# Patient Record
Sex: FEMALE | Race: WHITE | NOT HISPANIC OR LATINO | URBAN - METROPOLITAN AREA
[De-identification: names, ages, dates, MRNs, and addresses within clinical notes are randomized per-mention and may not be internally consistent; named-entity substitution may affect disease eponyms.]

---

## 2023-05-09 ENCOUNTER — EMERGENCY (EMERGENCY)
Facility: HOSPITAL | Age: 38
LOS: 1 days | Discharge: HOME | End: 2023-05-09
Admitting: STUDENT IN AN ORGANIZED HEALTH CARE EDUCATION/TRAINING PROGRAM
Payer: COMMERCIAL

## 2023-05-09 VITALS
OXYGEN SATURATION: 99 % | TEMPERATURE: 98 F | DIASTOLIC BLOOD PRESSURE: 74 MMHG | RESPIRATION RATE: 18 BRPM | WEIGHT: 100.09 LBS | SYSTOLIC BLOOD PRESSURE: 134 MMHG | HEART RATE: 70 BPM

## 2023-05-09 DIAGNOSIS — R22.1 LOCALIZED SWELLING, MASS AND LUMP, NECK: ICD-10-CM

## 2023-05-09 PROCEDURE — 99284 EMERGENCY DEPT VISIT MOD MDM: CPT

## 2023-05-09 NOTE — ED ADULT NURSE NOTE - OBJECTIVE STATEMENT
Pt presents to ED C/O neck cyst x months, seen and eval by PA/ ENT, protecting own airway, speaking full sentences, denies fevers/ difficulty breathing/ swallowing.

## 2023-05-09 NOTE — ED PROVIDER NOTE - NS ED ROS FT
Constitutional: No fever. No chills.  Eyes: No redness. No discharge. No vision change.   ENT: No sore throat. No ear pain.  Cardiovascular: No chest pain. No leg swelling.  Respiratory: No cough. No shortness of breath.  GI: No abdominal pain. No vomiting. No diarrhea.   MSK: No joint pain. No back pain.   Skin: No rash. No abrasions. +cystic structure.  Neuro: No numbness. No weakness.   Psych: No known mental health issues.

## 2023-05-09 NOTE — CONSULT NOTE ADULT - ASSESSMENT
-------------------------------  ASSESSMENT/PLAN:    IMPRESSION: SHAE FARIAS  is a 37y Female with     RECOMMENDATIONS:    ---  Thank you for the kind referral and for allowing me to share in the care of SHAE FARIAS If you have any questions, please do not hesitate to contact me.     Sincerely,  Zehra Leblanc  05-09-23 @ 14:18       -------------------------------  ASSESSMENT/PLAN:    IMPRESSION: SHAE FARIAS  is a 37y Female with a firm, palpable lesion on right neck under angle of the mandible.     RECOMMENDATIONS:  - Plan for outpatient follow up with Dr. Roberto in 1-2 weeks.   - rest of care per ED    ---  Thank you for the kind referral and for allowing me to share in the care of SHAE FARIAS If you have any questions, please do not hesitate to contact me.     Sincerely,  Zehra Leblanc  05-09-23 @ 14:18

## 2023-05-09 NOTE — ED PROVIDER NOTE - PHYSICAL EXAMINATION
VITAL SIGNS: I have reviewed nursing notes and confirm.  CONSTITUTIONAL: Well-developed; in no acute distress.   SKIN:  warm and dry, no acute rash. pea-sized firm, mobile structure on right lateral neck that is non-tender to palpation, no fluctuance, no redness.   HEAD:  normocephalic, atraumatic.  EYES: PERRL, EOM intact; conjunctiva and sclera clear.  ENT: No nasal discharge; airway clear.   NECK: Supple; non tender.  CARD: S1, S2 normal; no murmurs, gallops, or rubs. Regular rate and rhythm.   RESP:  Clear to auscultation b/l, no wheezes, rales or rhonchi.  ABD: Normal bowel sounds; soft; non-distended; non-tender; no guarding/ rebound.  EXT: Normal ROM. No clubbing, cyanosis or edema. 2+ pulses to b/l ue/le.  NEURO: Alert, oriented, grossly unremarkable  PSYCH: Cooperative, mood and affect appropriate.

## 2023-05-09 NOTE — ED PROVIDER NOTE - PATIENT PORTAL LINK FT
You can access the FollowMyHealth Patient Portal offered by [facility] by registering at the following website: [email or web address]. By joining [facility]’s FollowMyHealth portal, you will also be able to view your health information using other applications (apps) compatible with our system.

## 2023-05-09 NOTE — ED PROVIDER NOTE - OBJECTIVE STATEMENT
36yo female with no reported pmhx presents for removal of cyst on right neck. Pt reports cystic structure has been present for several months and gradually increasing in size. She states it causes her "discomfort," but denies pain. She has been evaluated by ENT Dr. Duffy, told to come to ED for evaluation for removal. She denies fever, chills, redness, drainage.

## 2023-05-09 NOTE — CONSULT NOTE ADULT - SUBJECTIVE AND OBJECTIVE BOX
OTOLARYNGOLOGY (ENT) CONSULTATION NOTE    PATIENT: SHAE FARIAS     MRN: [id number]       : [birth year]  DATE OF ADMISSION:23  DATE OF SERVICE:  23 @ 14:18    CHIEF COMPLAINT: neck lesion    HISTORY OF PRESENT ILLNESS:  SHAE FARIAS  is a 37y Female who presents with complaints of neck cyst for 3 months. She reports that she first noticed this cyst 3 months ago. It has not changed significantly in size since. She denies recent upper respiratory infection. She denies pain or drainage from the area.     PAST MEDICAL HISTORY:  No pertinent past medical history        CURRENT MEDICATIONS       HOME MEDICATIONS:      ALLERGIES:  No Known Allergies    SOCIAL HISTORY: Pertinent included in HPI   FAMILY HISTORY      SURGICAL HISTORY:  No significant past surgical history        REVIEW OF SYSTEMS: The patient was asked and responded to a review of systems regarding the following symptoms: constitutional, eye, ears, nose, mouth, throat, cardiovascular, respiratory. Pertinent factors have been included in the HPI.       PHYSICAL EXAM:  ENT EXAM-   Constitutional: Well-developed, well-nourished.  No hoarseness.     Head:  normocephalic, atraumatic.   Ears:  Ear canals both clear.  Tympanic membranes both intact; no effusion or retraction.  Nose:  Septum intact, midline, deviated.  Inferior turbinates normal bilateral  OC/OP:  Tonsils present/absent. Floor of mouth, buccal mucosa, lips, hard palate, soft palate, uvula, posterior pharyngeal wall normal.  Mucosa moist.  Neck:  Trachea midline.  Thyroid, parotid and submandibular glands normal.  Lymph:  No cervical adenopathy.  Facial Plastics:   MULTISYSTEM EXAM-  Neuro/Psych:  A&O x 3.  Mood stable.  Affect bright.  Cranial nerves: 2-12 grossly intact bilaterally.  Eyes:  EOMI, no nystagmus.  Pulm:  No dyspnea, non-labored breathing  Cardiovascular: Carotid pulses 2+ bilaterally.  No periphreal edema.  Skin:  No rash or lesions on exposed skin of head/neck      Vital Signs:  T(C): 36.7 (23 @ 10:40), Max: 36.7 (23 @ 10:40)  HR: 70 (23 @ 10:40) (70 - 70)  BP: 134/74 (23 @ 10:40) (134/74 - 134/74)  RR: 18 (23 @ 10:40) (18 - 18)  SpO2: 99% (23 @ 10:40) (99% - 99%)        [id number]    MICROBIOLOGY:      PATHOLOGY:                 OTOLARYNGOLOGY (ENT) CONSULTATION NOTE    PATIENT: SHAE FARIAS     MRN: [id number]       : [birth year]  DATE OF ADMISSION:23  DATE OF SERVICE:  23 @ 14:18    CHIEF COMPLAINT: neck lesion    HISTORY OF PRESENT ILLNESS:  SHAE FARIAS  is a 37y Female who presents with complaints of neck cyst for 3 months. She reports that she first noticed this cyst 3 months ago. It has not changed significantly in size since. She denies recent upper respiratory infection. She denies pain or drainage from the area. She denies a change in range of motion in her neck. She denies change in voice. She denies recent weight loss or weight gain. She denies any history of ENT complaints or previous ENT visits.     PAST MEDICAL HISTORY:  No pertinent past medical history        CURRENT MEDICATIONS       HOME MEDICATIONS:      ALLERGIES:  No Known Allergies    SOCIAL HISTORY: Pertinent included in HPI   FAMILY HISTORY      SURGICAL HISTORY:  No significant past surgical history        REVIEW OF SYSTEMS: The patient was asked and responded to a review of systems regarding the following symptoms: constitutional, eye, ears, nose, mouth, throat, cardiovascular, respiratory. Pertinent factors have been included in the HPI.       PHYSICAL EXAM:  ENT EXAM-   General: NAD, A+Ox3  Respiratory: No respiratory distress, stridor, or stertor  Voice quality: normal  Face:  Symmetric without masses or lesions  Nose: nasal cavity clear bilaterally, inferior turbinates normal, mucosa normal without crusting or bleeding  OC/OP: tongue normal, floor of mouth WNL, no masses or lesions, OP clear  Neck: soft/flat, no LAD, <1cm superficial palpable lesion on R neck under angle of mandible, non tender, without fluctuance  Neuro: CNII-XII intact    Vital Signs:  T(C): 36.7 (23 @ 10:40), Max: 36.7 (23 @ 10:40)  HR: 70 (23 @ 10:40) (70 - 70)  BP: 134/74 (23 @ 10:40) (134/74 - 134/74)  RR: 18 (23 @ 10:40) (18 - 18)  SpO2: 99% (23 @ 10:40) (99% - 99%)        [id number]    MICROBIOLOGY:      PATHOLOGY:

## 2023-05-09 NOTE — ED ADULT TRIAGE NOTE - OTHER COMPLAINTS
reports pain. States that she was told to meet MD García here. reports pain. States that she was told to meet MD Duffy here.

## 2023-05-09 NOTE — ED PROVIDER NOTE - CLINICAL SUMMARY MEDICAL DECISION MAKING FREE TEXT BOX
Pt is hemodynamically stable. She has a small cystic structure over the right lateral neck. This is non-tender without evidence of infection. ENT evaluated pt while in ED. No additional work up needed at this time per ENT. Pt will further follow up in office with Dr. Duffy. Return precautions given.

## 2023-05-09 NOTE — ED PROVIDER NOTE - NSFOLLOWUPINSTRUCTIONS_ED_ALL_ED_FT
Please follow up with your ENT Dr. Duffy. Call [phone] to schedule your appointment.    Return to the Emergency Department if you develop fever>100.4F, headache, vision changes, difficulty swallowing, redness or any other concerns.